# Patient Record
Sex: FEMALE | Race: WHITE | NOT HISPANIC OR LATINO | Employment: OTHER | URBAN - METROPOLITAN AREA
[De-identification: names, ages, dates, MRNs, and addresses within clinical notes are randomized per-mention and may not be internally consistent; named-entity substitution may affect disease eponyms.]

---

## 2019-08-27 NOTE — PATIENT DISCUSSION
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 Would recommend FA/FP today to evaluate circulation. R & B discussed in detail. Pt elects to proceed. FA/FP consistent with exam. 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 Vision likely will not worsen but VA will never be what it was before the blockage. Treatment not indicated at this time. Pt states she is seeing her cardiologist tomorrow. Stressed to importance of BP control in the prevention of Ocular complications. Advised pt to have records sent for continuity of care. Pt denied any pain while chewing, loss of appetite or headache. All blood work that was ordered by Dr. Andi Gannon came back WNL.

## 2019-10-25 NOTE — PATIENT DISCUSSION
Pt edu stable. 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 Vision likely will not worsen but VA will never be what it was before the blockage. Treatment not indicated at this time. Recommend watching for now. Advised pt to call with any vision changes.

## 2022-03-11 ENCOUNTER — NEW PATIENT (OUTPATIENT)
Dept: URBAN - METROPOLITAN AREA CLINIC 36 | Facility: CLINIC | Age: 57
End: 2022-03-11

## 2022-03-11 DIAGNOSIS — H52.7: ICD-10-CM

## 2022-03-11 DIAGNOSIS — E11.9: ICD-10-CM

## 2022-03-11 PROCEDURE — 92004 COMPRE OPH EXAM NEW PT 1/>: CPT

## 2022-03-11 PROCEDURE — 92015 DETERMINE REFRACTIVE STATE: CPT

## 2022-03-11 PROCEDURE — 92310PDW PREMIUM SPECIALTY DAILY WEAR

## 2022-03-11 PROCEDURE — 92310-2 LEVEL 2 CONTACT LENS MANAGEMENT

## 2022-03-11 ASSESSMENT — VISUAL ACUITY
OD_CC: J5
OS_SC: CF 6FT
OD_SC: J1+
OS_SC: J1+
OU_CC: J1+
OU_CC: 20/25+2
OS_CC: 20/200
OS_CC: J1
OD_SC: CF 6FT
OD_CC: 20/25

## 2022-03-11 ASSESSMENT — TONOMETRY
OS_IOP_MMHG: 19
OD_IOP_MMHG: 19

## 2022-04-08 ENCOUNTER — CONTACT LENSES/GLASSES VISIT (OUTPATIENT)
Dept: URBAN - METROPOLITAN AREA CLINIC 36 | Facility: CLINIC | Age: 57
End: 2022-04-08

## 2022-04-08 DIAGNOSIS — Z97.3: ICD-10-CM

## 2022-04-08 PROCEDURE — 92310F

## 2022-04-08 ASSESSMENT — VISUAL ACUITY
OS_CC: J4
OD_CC: 20/25
OU_CC: J1
OS_CC: 20/20-2
OD_CC: J2
OU_CC: 20/20-2

## 2022-05-11 ENCOUNTER — CONTACT LENSES/GLASSES VISIT (OUTPATIENT)
Dept: URBAN - METROPOLITAN AREA CLINIC 36 | Facility: CLINIC | Age: 57
End: 2022-05-11

## 2022-05-11 DIAGNOSIS — Z97.3: ICD-10-CM

## 2022-05-11 PROCEDURE — 92310F

## 2024-02-06 ENCOUNTER — COMPREHENSIVE EXAM (OUTPATIENT)
Dept: URBAN - METROPOLITAN AREA CLINIC 36 | Facility: CLINIC | Age: 59
End: 2024-02-06

## 2024-02-06 DIAGNOSIS — H04.123: ICD-10-CM

## 2024-02-06 DIAGNOSIS — H52.7: ICD-10-CM

## 2024-02-06 DIAGNOSIS — E11.9: ICD-10-CM

## 2024-02-06 PROCEDURE — 99214 OFFICE O/P EST MOD 30 MIN: CPT

## 2024-02-06 PROCEDURE — 92015 DETERMINE REFRACTIVE STATE: CPT

## 2024-02-06 PROCEDURE — 92310-2 LEVEL 2 CONTACT LENS MANAGEMENT

## 2024-02-06 ASSESSMENT — TONOMETRY
OS_IOP_MMHG: 15
OD_IOP_MMHG: 14

## 2024-02-06 ASSESSMENT — VISUAL ACUITY
OU_SC: J3
OS_SC: 20/20
OU_SC: 20/20
OS_SC: J5
OD_SC: 20/20
OD_SC: J6

## 2025-02-11 ENCOUNTER — COMPREHENSIVE EXAM (OUTPATIENT)
Age: 60
End: 2025-02-11

## 2025-02-11 DIAGNOSIS — H52.7: ICD-10-CM

## 2025-02-11 DIAGNOSIS — H04.123: ICD-10-CM

## 2025-02-11 DIAGNOSIS — E11.9: ICD-10-CM

## 2025-02-11 PROCEDURE — 92015 DETERMINE REFRACTIVE STATE: CPT

## 2025-02-11 PROCEDURE — 92014 COMPRE OPH EXAM EST PT 1/>: CPT

## 2025-02-11 PROCEDURE — 92310-2 LEVEL 2 SOFT LENS UPDATE

## 2025-03-11 ENCOUNTER — PREPPED CHART (OUTPATIENT)
Age: 60
End: 2025-03-11

## 2025-04-09 ENCOUNTER — CONTACT LENSES/GLASSES VISIT (OUTPATIENT)
Age: 60
End: 2025-04-09

## 2025-04-09 DIAGNOSIS — Z97.3: ICD-10-CM

## 2025-04-09 PROCEDURE — 92310F

## 2025-04-09 PROCEDURE — 99199RSP RESIDENT SUPERVISED BY PROVIDER
